# Patient Record
Sex: FEMALE | Race: WHITE | NOT HISPANIC OR LATINO | URBAN - NONMETROPOLITAN AREA
[De-identification: names, ages, dates, MRNs, and addresses within clinical notes are randomized per-mention and may not be internally consistent; named-entity substitution may affect disease eponyms.]

---

## 2022-05-05 ENCOUNTER — OFFICE (OUTPATIENT)
Dept: URBAN - NONMETROPOLITAN AREA CLINIC 5 | Facility: CLINIC | Age: 22
End: 2022-05-05

## 2022-05-05 VITALS
SYSTOLIC BLOOD PRESSURE: 128 MMHG | BODY MASS INDEX: 26.5 KG/M2 | RESPIRATION RATE: 18 BRPM | HEIGHT: 62 IN | WEIGHT: 144 LBS | DIASTOLIC BLOOD PRESSURE: 59 MMHG | HEART RATE: 81 BPM

## 2022-05-05 DIAGNOSIS — R10.30 LOWER ABDOMINAL PAIN, UNSPECIFIED: ICD-10-CM

## 2022-05-05 DIAGNOSIS — R19.7 DIARRHEA, UNSPECIFIED: ICD-10-CM

## 2022-05-05 DIAGNOSIS — K58.0 IRRITABLE BOWEL SYNDROME WITH DIARRHEA: ICD-10-CM

## 2022-05-05 PROCEDURE — 99204 OFFICE O/P NEW MOD 45 MIN: CPT | Performed by: INTERNAL MEDICINE

## 2022-05-05 NOTE — SERVICENOTES
Given patient's lower abdominal cramping which improves after a bowel movement, longstanding GI issues with previously normal EGD and colonoscopy, and frequent diarrhea do suspect IBS.  Counseled her on attempting soluble fiber as above.  Will also give samples of ib Marc for abdominal cramping.  Counseled her she can attempt Imodium as needed.  For her bloating we had an extensive discussion as outlined above.  Recommended she keep a food diary to assess for triggers her symptoms.  At follow-up would consider course of Xifaxan and if despite this with persistent symptoms would start on amitriptyline.  Will plan to see back in 3 months.

## 2022-05-05 NOTE — SERVICEHPINOTES
Jennifer Martinez   is a   22 yo  female  with a past medical history of ADD and IBS who presents to establish care for abdominal pain, diarrhea, constipation, and bloating.  Patient has had longstanding GI issues.  In review she previously followed with pediatric Gastroenterology.  She did have an EGD and colonoscopy with extensive biopsies obtained which were unremarkable aside from mild gastritis.  There was no evidence of celiac disease.  Patient reports she previously was on Levsin but did not see significant improvement in her symptoms.  She reports she primarily has stomach pains in her lower abdomen that come and go every other day.  The pain is resolved if she has a bowel movement.  She describes it as sharp pain with associated cold sweats.  This has been going on for many years.  She reports the consistency of her stool is always diarrhea.  She will have occasional constipation but even when she does have a BM she still has liquid stool.  She denies any bright blood per rectum, melena, weight loss, dysphagia, odynophagia, nocturnal BMs, or episodes of incontinence.  She does endorse urgency with bowel movements.  She denies any family history of colon cancer or inflammatory bowel disease.  She denies smoking or illicit drug use.  She does drink alcohol.  She is enrolled in school and works at a [employer].  She also endorses significant bloating.  She does drink through a straw but rarely drinks carbonated beverages, rarely chews gum, and consumes minimal dairy products.  She has never attempted a FODMAP diet.  She was accompanied by her mother today.